# Patient Record
Sex: FEMALE | URBAN - METROPOLITAN AREA
[De-identification: names, ages, dates, MRNs, and addresses within clinical notes are randomized per-mention and may not be internally consistent; named-entity substitution may affect disease eponyms.]

---

## 2019-03-19 ENCOUNTER — OFFICE VISIT (OUTPATIENT)
Dept: URBAN - METROPOLITAN AREA CLINIC 81 | Facility: CLINIC | Age: 54
End: 2019-03-19
Payer: MEDICARE

## 2019-03-19 DIAGNOSIS — H25.13 AGE-RELATED NUCLEAR CATARACT, BILATERAL: ICD-10-CM

## 2019-03-19 DIAGNOSIS — H52.223 REGULAR ASTIGMATISM, BILATERAL: Primary | ICD-10-CM

## 2019-03-19 DIAGNOSIS — H04.123 DRY EYE SYNDROME OF BILATERAL LACRIMAL GLANDS: ICD-10-CM

## 2019-03-19 PROCEDURE — 92014 COMPRE OPH EXAM EST PT 1/>: CPT | Performed by: OPTOMETRIST

## 2019-03-19 ASSESSMENT — VISUAL ACUITY
OS: 20/25
OD: 20/30

## 2019-03-19 ASSESSMENT — INTRAOCULAR PRESSURE
OD: 14
OS: 12

## 2019-03-19 ASSESSMENT — KERATOMETRY
OS: 42.38
OD: 42.25

## 2019-03-19 NOTE — IMPRESSION/PLAN
Impression: Regular astigmatism, bilateral: H52.223. Plan: Glasses Rx update option given. Recommend UV protection. Potential for initial adaptation. Pt understands. Discussed monitoring size, shape, color of eyelid freckle.

## 2019-03-19 NOTE — IMPRESSION/PLAN
Impression: Dry eye syndrome of bilateral lacrimal glands: H04.123. Plan: Reviewed AT with lid closure, gel or kanchan hs & omega 3's.

## 2019-03-19 NOTE — IMPRESSION/PLAN
Impression: Age-related nuclear cataract, bilateral: H25.13.  trace Plan: Pt understands that condition can influence BVA/Rx. Recommend UV protection, will continue to observe/monitor.

## 2022-02-02 ENCOUNTER — OFFICE VISIT (OUTPATIENT)
Dept: URBAN - METROPOLITAN AREA CLINIC 76 | Facility: CLINIC | Age: 57
End: 2022-02-02
Payer: MEDICARE

## 2022-02-02 DIAGNOSIS — H02.889 MGD OF EYE: ICD-10-CM

## 2022-02-02 DIAGNOSIS — Z79.899 OTHER LONG TERM (CURRENT) DRUG THERAPY: ICD-10-CM

## 2022-02-02 DIAGNOSIS — T37.2X5A ADVERSE EFFECT OF ANTIMALARIAL, INITIAL ENCOUNTER: ICD-10-CM

## 2022-02-02 DIAGNOSIS — M06.89 OTHER SPECIFIED RHEUMATOID ARTHRITIS, MULTIPLE SITES: Primary | ICD-10-CM

## 2022-02-02 PROCEDURE — 92134 CPTRZ OPH DX IMG PST SGM RTA: CPT | Performed by: OPTOMETRIST

## 2022-02-02 PROCEDURE — 99214 OFFICE O/P EST MOD 30 MIN: CPT | Performed by: OPTOMETRIST

## 2022-02-02 ASSESSMENT — VISUAL ACUITY
OS: 20/25
OD: 20/25

## 2022-02-02 ASSESSMENT — INTRAOCULAR PRESSURE
OD: 11
OS: 11

## 2022-02-02 ASSESSMENT — KERATOMETRY
OD: 42.38
OS: 42.50

## 2022-02-02 NOTE — IMPRESSION/PLAN
Impression: Adverse effect of antimalarial, initial encounter: T37.2X5A. Bilateral. Plan: See plan #1.

## 2022-02-02 NOTE — IMPRESSION/PLAN
Impression: Other specified rheumatoid arthritis, multiple sites: M06.89. Bilateral. patient has been on Hydroxychloroquine x 1 month, no toxicity found in today's exam. Plan: Discussed with patient. Continue to monitor without treatment. Continue medication as directed.  No toxicity found in today's exam.

## 2022-09-13 ENCOUNTER — OFFICE VISIT (OUTPATIENT)
Dept: URBAN - METROPOLITAN AREA CLINIC 76 | Facility: CLINIC | Age: 57
End: 2022-09-13
Payer: COMMERCIAL

## 2022-09-13 DIAGNOSIS — H00.024 HORDEOLUM INTERNUM LEFT UPPER EYELID: Primary | ICD-10-CM

## 2022-09-13 DIAGNOSIS — H00.015 HORDEOLUM EXTERNUM LEFT LOWER EYELID: ICD-10-CM

## 2022-09-13 PROCEDURE — 99213 OFFICE O/P EST LOW 20 MIN: CPT | Performed by: OPTOMETRIST

## 2022-09-13 ASSESSMENT — INTRAOCULAR PRESSURE
OS: 11
OD: 11

## 2022-09-13 NOTE — IMPRESSION/PLAN
Impression: Hordeolum internum left upper eyelid: H00.024 Left. Pt was put on unknown antibiotic today for UTI by her PCP. Plan: Discussed diagnosis in detail with patient. Start focal heat with a hard boiled egg or tea bag TID for at least 15 minutes a day. Recommend taking the antibiotics 15 minutes before doing the focal heat. Advised patient that the hordeolum may come to a head and drain, recommend using a warm wash cloth to clean any of the discharge. **Pt instructed to call once she picks up antibiotic that was prescribed today by her PCP for UTI, will decide if that antibiotic will suffice or will send alternative.

## 2022-09-21 ENCOUNTER — OFFICE VISIT (OUTPATIENT)
Dept: URBAN - METROPOLITAN AREA CLINIC 76 | Facility: CLINIC | Age: 57
End: 2022-09-21
Payer: COMMERCIAL

## 2022-09-21 DIAGNOSIS — H00.024 HORDEOLUM INTERNUM LEFT UPPER EYELID: Primary | ICD-10-CM

## 2022-09-21 DIAGNOSIS — H01.009 BLEPHARITIS OF EYELID: ICD-10-CM

## 2022-09-21 DIAGNOSIS — H00.015 HORDEOLUM EXTERNUM LEFT LOWER EYELID: ICD-10-CM

## 2022-09-21 PROCEDURE — 99213 OFFICE O/P EST LOW 20 MIN: CPT | Performed by: OPTOMETRIST

## 2022-09-21 RX ORDER — DOXYCYCLINE HYCLATE 100 MG/1
100 MG TABLET, COATED ORAL
Qty: 30 | Refills: 0 | Status: ACTIVE
Start: 2022-09-21

## 2022-09-21 ASSESSMENT — INTRAOCULAR PRESSURE
OD: 12
OS: 12

## 2022-09-21 NOTE — IMPRESSION/PLAN
Impression: Hordeolum internum left upper eyelid: H00.024 Left. 50% resolved. Plan: Discussed diagnosis in detail with patient. Start focal heat with a hard boiled egg or tea bag TID for at least 15 minutes a day. Start Doxycycline 100mg QD PO. Recommend taking the antibiotics 15 minutes before doing the focal heat. Advised patient that the hordeolum may come to a head and drain, recommend using a warm wash cloth to clean any of the discharge.

## 2022-09-21 NOTE — IMPRESSION/PLAN
Impression: Blepharitis of eyelid: H01.009. Bilateral. Plan: Discussed. Discussed diagnosis in detail with patient. Warm compresses with lid massage from top / down, bottom / up, and sweep from inside / out x 2. Patient instructed to use emulsive base lubricant 3-4 x a day. Increase omega foods/nuts and/or Borage/Fish/Krill/Primrose oil supplement 1500-2500mg capsule orally per day. Start Ocusoft plus lid scrub QHS OU, samples given.  Consider sending in Vista tea tree cleansing foam at next visit if symptoms persist.

## 2022-10-06 ENCOUNTER — OFFICE VISIT (OUTPATIENT)
Dept: URBAN - METROPOLITAN AREA CLINIC 76 | Facility: CLINIC | Age: 57
End: 2022-10-06
Payer: COMMERCIAL

## 2022-10-06 DIAGNOSIS — H10.45 OTHER CHRONIC ALLERGIC CONJUNCTIVITIS: ICD-10-CM

## 2022-10-06 DIAGNOSIS — H00.14 CHALAZION LEFT UPPER EYELID: Primary | ICD-10-CM

## 2022-10-06 DIAGNOSIS — H00.015 HORDEOLUM EXTERNUM LEFT LOWER EYELID: ICD-10-CM

## 2022-10-06 PROCEDURE — 99213 OFFICE O/P EST LOW 20 MIN: CPT | Performed by: OPTOMETRIST

## 2022-10-06 ASSESSMENT — INTRAOCULAR PRESSURE
OD: 14
OS: 13

## 2022-10-06 NOTE — IMPRESSION/PLAN
Impression: Hordeolum externum left lower eyelid: H00.015. 95% healed Plan: Discussed with patient. Continue to monitor. Pt to call if symptoms worsen.

## 2022-10-06 NOTE — IMPRESSION/PLAN
Impression: Chalazion left upper eyelid: H00.14. Left. 75% resolved. Plan: Discussed with patient. Continue focal heat at least QD OS. Finish Doxycycline 100 mg tab QD PO. Pt to call if symptoms worsen.

## 2023-05-25 ENCOUNTER — OFFICE VISIT (OUTPATIENT)
Dept: URBAN - METROPOLITAN AREA CLINIC 76 | Facility: CLINIC | Age: 58
End: 2023-05-25
Payer: COMMERCIAL

## 2023-05-25 DIAGNOSIS — H25.13 AGE-RELATED NUCLEAR CATARACT, BILATERAL: ICD-10-CM

## 2023-05-25 DIAGNOSIS — T37.2X5A ADVERSE EFFECT OF ANTIMALARIAL, INITIAL ENCOUNTER: ICD-10-CM

## 2023-05-25 DIAGNOSIS — H02.889 MGD OF EYE: ICD-10-CM

## 2023-05-25 DIAGNOSIS — H10.45 OTHER CHRONIC ALLERGIC CONJUNCTIVITIS: ICD-10-CM

## 2023-05-25 DIAGNOSIS — M06.89 OTHER SPECIFIED RHEUMATOID ARTHRITIS, MULTIPLE SITES: ICD-10-CM

## 2023-05-25 DIAGNOSIS — Z79.899 OTHER LONG TERM (CURRENT) DRUG THERAPY: Primary | ICD-10-CM

## 2023-05-25 PROCEDURE — 92014 COMPRE OPH EXAM EST PT 1/>: CPT | Performed by: OPTOMETRIST

## 2023-05-25 PROCEDURE — 92134 CPTRZ OPH DX IMG PST SGM RTA: CPT | Performed by: OPTOMETRIST

## 2023-05-25 PROCEDURE — 92083 EXTENDED VISUAL FIELD XM: CPT | Performed by: OPTOMETRIST

## 2023-05-25 ASSESSMENT — KERATOMETRY
OD: 42.38
OS: 42.50

## 2023-05-25 ASSESSMENT — INTRAOCULAR PRESSURE
OD: 17
OS: 12

## 2023-05-25 NOTE — IMPRESSION/PLAN
Impression: Other long term (current) drug therapy: Z79.899. Bilateral. Pt stopped plaquenil a few months ago and is doing infusions instead (unknown name). Performed and reviewed VF/OCT today. 5/25/23 OCT: normal OU.
5/25/23 VF: normal OU. Plan: Discussed with patient. No signs of toxicity. OK to hold off on testing for now. Continue care with Dr. Khang Cage. Recommend yearly exams.

## 2023-05-25 NOTE — IMPRESSION/PLAN
Impression: Other specified rheumatoid arthritis, multiple sites: M06.89. Bilateral. Plan: See plan #1.

## 2023-07-25 ENCOUNTER — OFFICE VISIT (OUTPATIENT)
Dept: URBAN - METROPOLITAN AREA CLINIC 76 | Facility: CLINIC | Age: 58
End: 2023-07-25
Payer: COMMERCIAL

## 2023-07-25 DIAGNOSIS — H11.31 SUBCONJUNCTIVAL HEMORRHAGE OF RIGHT EYE: Primary | ICD-10-CM

## 2023-07-25 DIAGNOSIS — H04.123 DRY EYE SYNDROME OF BILATERAL LACRIMAL GLANDS: ICD-10-CM

## 2023-07-25 PROCEDURE — 99213 OFFICE O/P EST LOW 20 MIN: CPT | Performed by: OPTOMETRIST

## 2023-07-25 ASSESSMENT — INTRAOCULAR PRESSURE
OS: 10
OD: 10

## 2023-08-11 ENCOUNTER — OFFICE VISIT (OUTPATIENT)
Dept: URBAN - METROPOLITAN AREA CLINIC 76 | Facility: CLINIC | Age: 58
End: 2023-08-11
Payer: COMMERCIAL

## 2023-08-11 DIAGNOSIS — H11.31 SUBCONJUNCTIVAL HEMORRHAGE OF RIGHT EYE: Primary | ICD-10-CM

## 2023-08-11 PROCEDURE — 99213 OFFICE O/P EST LOW 20 MIN: CPT | Performed by: OPTOMETRIST

## 2023-08-11 ASSESSMENT — INTRAOCULAR PRESSURE
OS: 12
OD: 12

## 2024-07-24 ENCOUNTER — OFFICE VISIT (OUTPATIENT)
Dept: URBAN - METROPOLITAN AREA CLINIC 76 | Facility: CLINIC | Age: 59
End: 2024-07-24
Payer: COMMERCIAL

## 2024-07-24 DIAGNOSIS — H25.13 AGE-RELATED NUCLEAR CATARACT, BILATERAL: Primary | ICD-10-CM

## 2024-07-24 DIAGNOSIS — H52.223 REGULAR ASTIGMATISM, BILATERAL: ICD-10-CM

## 2024-07-24 DIAGNOSIS — Z79.899 OTHER LONG TERM (CURRENT) DRUG THERAPY: ICD-10-CM

## 2024-07-24 DIAGNOSIS — M06.89 OTHER SPECIFIED RHEUMATOID ARTHRITIS, MULTIPLE SITES: ICD-10-CM

## 2024-07-24 PROCEDURE — 99214 OFFICE O/P EST MOD 30 MIN: CPT | Performed by: OPTOMETRIST

## 2024-07-24 ASSESSMENT — VISUAL ACUITY
OS: 20/25
OD: 20/25

## 2024-07-24 ASSESSMENT — INTRAOCULAR PRESSURE
OD: 13
OS: 10

## 2024-07-24 ASSESSMENT — KERATOMETRY
OD: 42.50
OS: 42.75

## 2025-03-07 ENCOUNTER — OFFICE VISIT (OUTPATIENT)
Dept: URBAN - METROPOLITAN AREA CLINIC 76 | Facility: CLINIC | Age: 60
End: 2025-03-07
Payer: COMMERCIAL

## 2025-03-07 DIAGNOSIS — H10.45 OTHER CHRONIC ALLERGIC CONJUNCTIVITIS: ICD-10-CM

## 2025-03-07 DIAGNOSIS — H00.025 HORDEOLUM INTERNUM LEFT LOWER EYELID: Primary | ICD-10-CM

## 2025-03-07 DIAGNOSIS — B88.0 DERMATITIS DUE TO DEMODEX SPECIES: ICD-10-CM

## 2025-03-07 RX ORDER — DOXYCYCLINE HYCLATE 100 MG/1
100 MG TABLET, COATED ORAL
Qty: 30 | Refills: 0 | Status: ACTIVE
Start: 2025-03-07

## 2025-03-07 RX ORDER — AZELASTINE HYDROCHLORIDE 0.5 MG/ML
0.05 % SOLUTION/ DROPS INTRAOCULAR
Qty: 5 | Refills: 3 | Status: ACTIVE
Start: 2025-03-07

## 2025-03-07 ASSESSMENT — INTRAOCULAR PRESSURE
OS: 11
OD: 13

## 2025-03-07 ASSESSMENT — KERATOMETRY
OS: 42.63
OD: 42.50